# Patient Record
Sex: FEMALE
[De-identification: names, ages, dates, MRNs, and addresses within clinical notes are randomized per-mention and may not be internally consistent; named-entity substitution may affect disease eponyms.]

---

## 2020-08-20 PROBLEM — Z00.00 ENCOUNTER FOR PREVENTIVE HEALTH EXAMINATION: Status: ACTIVE | Noted: 2020-08-20

## 2020-08-24 ENCOUNTER — APPOINTMENT (OUTPATIENT)
Dept: NEUROSURGERY | Facility: CLINIC | Age: 40
End: 2020-08-24
Payer: COMMERCIAL

## 2020-08-24 VITALS — WEIGHT: 150 LBS | HEIGHT: 65 IN | BODY MASS INDEX: 24.99 KG/M2

## 2020-08-24 DIAGNOSIS — Z82.49 FAMILY HISTORY OF ISCHEMIC HEART DISEASE AND OTHER DISEASES OF THE CIRCULATORY SYSTEM: ICD-10-CM

## 2020-08-24 DIAGNOSIS — Z85.850 PERSONAL HISTORY OF MALIGNANT NEOPLASM OF THYROID: ICD-10-CM

## 2020-08-24 DIAGNOSIS — G93.2 BENIGN INTRACRANIAL HYPERTENSION: ICD-10-CM

## 2020-08-24 DIAGNOSIS — Z78.9 OTHER SPECIFIED HEALTH STATUS: ICD-10-CM

## 2020-08-24 DIAGNOSIS — Z86.39 PERSONAL HISTORY OF OTHER ENDOCRINE, NUTRITIONAL AND METABOLIC DISEASE: ICD-10-CM

## 2020-08-24 DIAGNOSIS — Z63.5 DISRUPTION OF FAMILY BY SEPARATION AND DIVORCE: ICD-10-CM

## 2020-08-24 PROCEDURE — 99203 OFFICE O/P NEW LOW 30 MIN: CPT | Mod: 95

## 2020-08-24 RX ORDER — LEVOTHYROXINE SODIUM 125 UG/1
125 TABLET ORAL
Refills: 0 | Status: ACTIVE | COMMUNITY

## 2020-08-24 RX ORDER — CHROMIUM 200 MCG
TABLET ORAL
Refills: 0 | Status: ACTIVE | COMMUNITY

## 2020-08-24 RX ORDER — MULTIVITAMIN
TABLET ORAL
Refills: 0 | Status: ACTIVE | COMMUNITY

## 2020-08-24 RX ORDER — TOPIRAMATE 50 MG/1
50 TABLET, COATED ORAL
Refills: 0 | Status: ACTIVE | COMMUNITY

## 2020-08-24 SDOH — SOCIAL STABILITY - SOCIAL INSECURITY: DISRUPTION OF FAMILY BY SEPARATION AND DIVORCE: Z63.5

## 2020-08-24 NOTE — REASON FOR VISIT
[New Patient Visit] : a new patient visit [Referred By: _________] : Patient was referred by BRIAN [Other: _____] : [unfilled]

## 2020-08-24 NOTE — PHYSICAL EXAM
[General Appearance - Alert] : alert [General Appearance - In No Acute Distress] : in no acute distress [General Appearance - Well Nourished] : well nourished [General Appearance - Well Developed] : well developed [General Appearance - Well-Appearing] : healthy appearing [Oriented To Time, Place, And Person] : oriented to person, place, and time [Place] : oriented to place [Time] : oriented to time [Person] : oriented to person [Exaggerated Use Of Accessory Muscles For Inspiration] : no accessory muscle use [] : no respiratory distress

## 2020-08-26 NOTE — ASSESSMENT
[FreeTextEntry1] : IMPRESSION:\par 1. IIH without c/o vision problems.\par \par \par PLAN:\par 1. MRV w/wo contrast which Dr. Lundy will review to see if venous stenting will be beneficial to patient\par 2. Pt to send neuro ophthalmological report after her assessment on 9/3/20 to us.\par 3. Discussed venous stenting with very low stroke related risk.\par

## 2020-08-26 NOTE — HISTORY OF PRESENT ILLNESS
[> 3 months] : more  than 3 months [Home] : at home, [unfilled] , at the time of the visit. [Medical Office: ([facility])___] : at the medical office located in  [Verbal consent obtained from patient] : the patient, [unfilled] [Other:____] : [unfilled] [FreeTextEntry1] : "IIH" [de-identified] : Daxa Guy is a pleasant 39 year old right handed lady wo presents for consultation regarding idiopathic intracranial HTN.  Per patient, last October 2019 she had a severe headache which se described as "primarily pressure behind left head" that radiated behind left eye and left ear.  She saw a neurologist who wanted to treat her for migraines so she saw a different neurologist who ordered neuro imaging.  She had MRI brain which showed some narrowing of a vessel.  She was then diagnosed with IIH and started taking Diamox 1 gm daily which she did not tolerate.  She is currently taking Topamax 50 mg at  - states that she has side effects of tingling and exhaustion however she tolerates.  \par \par She also had a spinal tap in March 2020 - done in prone position with opening pressure of 20 and closing pressure 12.5.\par \par She denies visual problems however she is concerned that IIH can lead to vision loss.\par \par Telehealth visit started at 2:30 - 3 pm